# Patient Record
Sex: FEMALE | URBAN - METROPOLITAN AREA
[De-identification: names, ages, dates, MRNs, and addresses within clinical notes are randomized per-mention and may not be internally consistent; named-entity substitution may affect disease eponyms.]

---

## 2017-03-24 ENCOUNTER — VIRTUAL VISIT (OUTPATIENT)
Dept: FAMILY MEDICINE | Facility: OTHER | Age: 42
End: 2017-03-24

## 2017-03-24 NOTE — PROGRESS NOTES
"Date: [id number]  Clinician: Alonso Smith  Clinician NPI: [id number]  Patient: Karin Ibarra  Patient : 1975  Patient Address: [address] [place] [address]  Patient Phone: [phone]  Visit Protocol: UTI  Patient Summary:  Karin is a 42 year old ( : 1975 ) female who initiated a Zip for a presumed bladder infection. When asked the question \"Do you have a [place] primary care physician?\", Karin responded \"No\".    Her symptoms began today and consist of urgency, dysuria, and urinary frequency.   Symptom Details   Urinary Frequency: Several times each hour    She denies nausea, loss of appetite, chills, fever, urinary incontinence, hesitation, vaginal discharge, abdominal pain, vomiting, recent antibiotic use, flank pain, hematuria, and foul smelling urine. Karin has never had kidney stones. She has not been hospitalized, been a patient in a nursing home, or had a catheter in the past two weeks. She denies risk factors for sexually transmitted infections.   Karin has had one (1) UTI in the past 12 months. Her most recent bladder infection was not within the last 4 weeks. Her current symptoms are similar to the previous UTI symptoms. She took ciprofloxacin for her last infection and found it to be effective.   Karin does not get yeast infections when she takes antibiotics.   She states she is not pregnant and denies breastfeeding. She has menstruated in the past month.   She does NOT smoke or use smokeless tobacco.  MEDICATIONS:  No current medications , ALLERGIES:  NKDA   Clinician Response:  Dear Karin,  Based on the information you have provided, you likely have a bladder infection, also called an acute urinary tract infection (UTI).   To treat your infection, I am prescribing:   Nitrofurantoin (Macrobid). Swallow one (1) tablet twice a day for 5 days. Take the tablet with food. Continue taking the tablets even if you feel better before all the medication " is gone. There is no refill with this prescription.   Antibiotic selections by the clinician are based on safety and effectiveness. You may or may not be prescribed the same medication that you took for your last bladder infection.   Some people develop allergies to antibiotics. If you notice a new rash, significant swelling, or difficulty breathing, stop the medication immediately and go into a clinic for physical evaluation.   To help treat your current UTI and prevent future occurrences, remember to:     Drink 8-10, 8-ounce glasses of water daily.    Urinate after sexual intercourse.    Wipe front to back after using the bathroom.     Some women may develop a yeast infection as a side effect of taking antibiotics. If you notice symptoms of a yeast infection, [facility] can help treat that condition as well. Simply log in and complete another Zip, which will cover all of the necessary questions to determine the best treatment for you.   You should visit a clinic for a follow-up visit if your symptoms do not improve in 1-2 days or if you experience another urinary tract infection soon after completing this treatment.  If you become pregnant during this course of treatment, stop taking the medication and contact your primary care clinician.   Diagnosis: Acute Uncomplicated Bladder Infection  Diagnosis ICD: N39.0  Prescription: nitrofurantoin (Macrobid) 100mg oral tablet 10 tablets, 5 days supply. Take one tablet by mouth two times a day for 5 days. Refills: 0, Refill as needed: no, Allow substitutions: yes  Prescription Sent At: March 24 14:34:01, 2017  Pharmacy: [id number] - [phone] - [address] [place] [address]

## 2017-05-01 ENCOUNTER — VIRTUAL VISIT (OUTPATIENT)
Dept: FAMILY MEDICINE | Facility: OTHER | Age: 42
End: 2017-05-01

## 2017-05-01 NOTE — PROGRESS NOTES
"Date:   Clinician: Scott Villarreal  Clinician NPI: [id number]  Patient: Karin Ibarra  Patient : 1975  Patient Address: [address] [place] [address]  Patient Phone: [phone]  Visit Protocol: UTI  Patient Summary:  Karin is a 42 year old ( : 1975 ) female who initiated a Visit for a presumed bladder infection. When asked the question \"Please sign me up to receive news, health information and promotions. \", Karin responded \"No\".    Her symptoms began yesterday and consist of urinary frequency, urgency, and dysuria.   Symptom Details   Urinary Frequency: Several times each hour    She denies flank pain, hematuria, foul smelling urine, vaginal discharge, abdominal pain, recent antibiotic use, nausea, loss of appetite, chills, fever, urinary incontinence, hesitation, and vomiting. Karin has never had kidney stones. She has not been hospitalized, been a patient in a nursing home, or had a catheter in the past two weeks. She denies risk factors for sexually transmitted infections.   Karin has had two (2) UTIs in the past 12 months. Her most recent bladder infection was not within the last 4 weeks. Her current symptoms are similar to the previous UTI symptoms. She took nitrofurantoin for her last infection and found it to be effective.   Karin does not get yeast infections when she takes antibiotics.   She states she is not pregnant and denies breastfeeding. She has menstruated in the past month.   She does NOT smoke or use smokeless tobacco.  MEDICATIONS:  No current medications , ALLERGIES:  NKDA   Clinician Response:  Dear Karin,  Based on the information you have provided, you likely have a bladder infection, also called an acute urinary tract infection (UTI).   To treat your infection, I am prescribing:   Nitrofurantoin (Macrobid). Swallow one (1) tablet twice a day for 5 days. Take the tablet with food. Continue taking the tablets even if you feel " better before all the medication is gone. There is no refill with this prescription.   Antibiotic selections by the provider are based on safety and effectiveness. You may or may not be prescribed the same medication that you took for your last bladder infection.   Some people develop allergies to antibiotics. If you notice a new rash, significant swelling, or difficulty breathing, stop the medication immediately and go into a clinic for physical evaluation.   To help treat your current UTI and prevent future occurrences, remember to:     Drink 8-10, 8-ounce glasses of water daily.    Urinate after sexual intercourse.    Wipe front to back after using the bathroom.     Some women may develop a yeast infection as a side effect of taking antibiotics. If you notice symptoms of a yeast infection, [facility] can help treat that condition as well. Simply log in and complete another Visit, which will cover all of the necessary questions to determine the best treatment for you.   You should visit a clinic for a follow-up visit if your symptoms do not improve in 1-2 days or if you experience another urinary tract infection soon after completing this treatment.  If you become pregnant during this course of treatment, stop taking the medication and contact your primary care provider.   Diagnosis: Acute Uncomplicated Bladder Infection  Diagnosis ICD: N39.0  Prescription: nitrofurantoin (Macrobid) 100mg oral tablet 10 tablets, 5 days supply. Take one tablet by mouth two times a day for 5 days. Refills: 0, Refill as needed: no, Allow substitutions: yes  Prescription Sent At: May 01 08:18:06, 2017  Pharmacy: [id number] - [phone] - [address] [id number]

## 2018-07-13 ENCOUNTER — VIRTUAL VISIT (OUTPATIENT)
Dept: FAMILY MEDICINE | Facility: OTHER | Age: 43
End: 2018-07-13

## 2018-07-13 NOTE — PROGRESS NOTES
"Date:   Clinician: Josephine Herman  Clinician NPI: [id number]  Patient: Karin Ibarra  Patient : 1975  Patient Address: [address] [place] [address]  Patient Phone: [phone]  Visit Protocol: Oral mouth sores  Patient Summary:  Karin is a 43 year old ( : 1975 ) female who initiated a Visit for evaluation of oral mouth sores, specifically cold sores. When asked the question \"Please sign me up to receive news, health information and promotions. \", Karin responded \"No\".    Karin uploaded images of her condition.   She currently has lip sores. The current sores have been present since yesterday. She notes pain at the affected area and blisters. The sores are recurrent and not spreading to other parts of the body. The sores are unilateral and are grouped in a cluster of lesions. Her pain preceded the appearance of the sores. She has had 1 occurrences in the past three months.   Karin last had an outbreak over 2 months ago and has tried Acyclovir (Zovirax) Pills and Valacyclovir (Valtrex) to treat the sores in the past. Valacyclovir (Valtrex) was effective in treating the sores. Acyclovir (Zovirax) pills were somewhat effective in treating the sores.   She denies: pus, warmth or redness around the affected skin area, feeling feverish, that the rash is spreading, and having a similar rash on other body parts. The rash is not located near the eyes.   The patient does not smoke or use smokeless tobacco.   She denies pregnancy and denies breastfeeding. She has menstruated in the past month.   MEDICATIONS: No current medications, ALLERGIES: NKDA  Clinician Response:  Dear Karin,  Based on the information provided, the lesions or sores you have are most likely cold sores, also known as fever blisters.  The technical term is 'herpes simplex virus type 1'. Common symptoms include a tingling sensation on the mouth or lips, fluid filled blisters, or crusting on the " skin.   I am prescribing:   Valacyclovir 1 gram oral tablet. Take 2 tablets by mouth every 12 hours for 1 day. If you think your oral sores are returning, refill the medication and use it according to the instructions. Your prescription includes 1 refill.  If you become pregnant during this course of treatment with medication, stop taking the medication and contact your primary care provider.   To avoid spreading cold sores to other people, or to other parts of your body, try not to itch or scratch the sores.  Scratching or itching the sores may also cause the development of a secondary infection. Be sure to wash your hands with soap and water after touching the sores.  If cold sores do not heal within 2 weeks, please see your healthcare provider  for further evaluation.   Diagnosis: Herpes Simplex (Cold Sores)  Diagnosis ICD: B00.9  Prescription: valacyclovir (Valtrex) 1 gram oral tablet 4 tablet, 1 days supply. Take 2 tablets by mouth every 12 hours for 1 day. Refills: 1, Refill as needed: no, Allow substitutions: yes  Pharmacy: [id number] - [phone] - [address] [id number]

## 2018-10-08 ENCOUNTER — VIRTUAL VISIT (OUTPATIENT)
Dept: FAMILY MEDICINE | Facility: OTHER | Age: 43
End: 2018-10-08

## 2018-10-08 NOTE — PROGRESS NOTES
"Date:   Clinician: Josephine Herman  Clinician NPI: [id number]  Patient: Karin Ibarra  Patient : 1975  Patient Address: [address] [place] [id number]  Patient Phone: [phone]  Visit Protocol: UTI  Patient Summary:  Karin is a 43 year old ( : 1975 ) female who initiated a Visit for a presumed bladder infection. When asked the question \"Please sign me up to receive news, health information and promotions. \", Karin responded \"No\".   A synchronous visit is necessary because the patient reported the following abnormal symptoms:   Has been prescribed antibiotics to prevent bladder infections (requires clarification)   Her symptoms started today and consist of urgency, foul-smelling urine, dysuria, and urinary frequency.   Symptom details   Urine color: The color of her urine is yellow.    Denied symptoms include feeling as if the bladder is never empty, flank pain, chills, vaginal itching, nausea, urinary incontinence, vaginal discharge, abdominal pain, and vomiting. She does not feel feverish.   Karin has not used any over-the-counter medications or home remedies to relieve her current symptoms.  Precipitating events  Karin denies having a sexually transmitted disease.  Pertinent medical history  Karin has had a bladder infection before and has had 1 in the past 12 months. Her most recent bladder infection was not within the last 4 weeks. Her current symptoms are similar to her previous bladder infection symptoms.   She is not sure what antibiotics have been effective in treating her past bladder infections.   Karin has been prescribed antibiotics to prevent frequent or repeated bladder infections in the past.  Karin does not get yeast infections when she takes antibiotics. She has not experienced problems or side effects with any of the common antibiotics used to treat bladder infections.   Karin does not have a history of kidney stones. She " has not used a catheter or been a patient in a hospital or nursing home in the past 2 weeks.   Karin does not smoke or use smokeless tobacco.   She denies pregnancy and denies breastfeeding. She is currently menstruating.   MEDICATIONS: No current medications, ALLERGIES: NKDA  Clinician Response:  Dear Karin,   I am sorry you are not feeling well. Additional information was needed to clarify some of the details provided during your Visit. Because I was unable to reach you, I would like you to be seen in person to further discuss your health history and symptoms so you get the most appropriate care for you.  You will not be charged for this Visit. Thank you for trusting us with your care.   Diagnosis: Unable to contact patient  Diagnosis ICD: R69  Triage Notes: No answer  Synchronous Triage: phone, status: incomplete, duration: 51 seconds